# Patient Record
Sex: FEMALE | Race: WHITE | HISPANIC OR LATINO | Employment: UNEMPLOYED | URBAN - METROPOLITAN AREA
[De-identification: names, ages, dates, MRNs, and addresses within clinical notes are randomized per-mention and may not be internally consistent; named-entity substitution may affect disease eponyms.]

---
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
## 2021-12-09 ENCOUNTER — HOSPITAL ENCOUNTER (OUTPATIENT)
Dept: LAB | Facility: MEDICAL CENTER | Age: 21
End: 2021-12-09
Attending: PHYSICIAN ASSISTANT
Payer: MEDICAID

## 2021-12-09 LAB
ALBUMIN SERPL BCP-MCNC: 4.9 G/DL (ref 3.2–4.9)
ALBUMIN/GLOB SERPL: 1.8 G/DL
ALP SERPL-CCNC: 50 U/L (ref 30–99)
ALT SERPL-CCNC: 21 U/L (ref 2–50)
ANION GAP SERPL CALC-SCNC: 14 MMOL/L (ref 7–16)
AST SERPL-CCNC: 24 U/L (ref 12–45)
BASOPHILS # BLD AUTO: 1 % (ref 0–1.8)
BASOPHILS # BLD: 0.07 K/UL (ref 0–0.12)
BILIRUB SERPL-MCNC: 0.8 MG/DL (ref 0.1–1.5)
BUN SERPL-MCNC: 11 MG/DL (ref 8–22)
CALCIUM SERPL-MCNC: 9.3 MG/DL (ref 8.5–10.5)
CHLORIDE SERPL-SCNC: 102 MMOL/L (ref 96–112)
CHOLEST SERPL-MCNC: 187 MG/DL (ref 100–199)
CO2 SERPL-SCNC: 23 MMOL/L (ref 20–33)
CREAT SERPL-MCNC: 0.76 MG/DL (ref 0.5–1.4)
EOSINOPHIL # BLD AUTO: 0.38 K/UL (ref 0–0.51)
EOSINOPHIL NFR BLD: 5.2 % (ref 0–6.9)
ERYTHROCYTE [DISTWIDTH] IN BLOOD BY AUTOMATED COUNT: 40.4 FL (ref 35.9–50)
FOLATE SERPL-MCNC: 24.6 NG/ML
GLOBULIN SER CALC-MCNC: 2.8 G/DL (ref 1.9–3.5)
GLUCOSE SERPL-MCNC: 73 MG/DL (ref 65–99)
HCT VFR BLD AUTO: 44.6 % (ref 37–47)
HDLC SERPL-MCNC: 58 MG/DL
HGB BLD-MCNC: 14.8 G/DL (ref 12–16)
IMM GRANULOCYTES # BLD AUTO: 0.02 K/UL (ref 0–0.11)
IMM GRANULOCYTES NFR BLD AUTO: 0.3 % (ref 0–0.9)
LDLC SERPL CALC-MCNC: 108 MG/DL
LYMPHOCYTES # BLD AUTO: 2.93 K/UL (ref 1–4.8)
LYMPHOCYTES NFR BLD: 39.8 % (ref 22–41)
MCH RBC QN AUTO: 29.4 PG (ref 27–33)
MCHC RBC AUTO-ENTMCNC: 33.2 G/DL (ref 33.6–35)
MCV RBC AUTO: 88.7 FL (ref 81.4–97.8)
MONOCYTES # BLD AUTO: 0.36 K/UL (ref 0–0.85)
MONOCYTES NFR BLD AUTO: 4.9 % (ref 0–13.4)
NEUTROPHILS # BLD AUTO: 3.6 K/UL (ref 2–7.15)
NEUTROPHILS NFR BLD: 48.8 % (ref 44–72)
NRBC # BLD AUTO: 0 K/UL
NRBC BLD-RTO: 0 /100 WBC
PLATELET # BLD AUTO: 329 K/UL (ref 164–446)
PMV BLD AUTO: 11.6 FL (ref 9–12.9)
POTASSIUM SERPL-SCNC: 4 MMOL/L (ref 3.6–5.5)
PROT SERPL-MCNC: 7.7 G/DL (ref 6–8.2)
RBC # BLD AUTO: 5.03 M/UL (ref 4.2–5.4)
SODIUM SERPL-SCNC: 139 MMOL/L (ref 135–145)
T4 FREE SERPL-MCNC: 1.4 NG/DL (ref 0.93–1.7)
TRIGL SERPL-MCNC: 103 MG/DL (ref 0–149)
TSH SERPL DL<=0.005 MIU/L-ACNC: 0.79 UIU/ML (ref 0.38–5.33)
VIT B12 SERPL-MCNC: 573 PG/ML (ref 211–911)
WBC # BLD AUTO: 7.4 K/UL (ref 4.8–10.8)

## 2021-12-09 PROCEDURE — 85025 COMPLETE CBC W/AUTO DIFF WBC: CPT

## 2021-12-09 PROCEDURE — 80061 LIPID PANEL: CPT

## 2021-12-09 PROCEDURE — 84439 ASSAY OF FREE THYROXINE: CPT

## 2021-12-09 PROCEDURE — 36415 COLL VENOUS BLD VENIPUNCTURE: CPT

## 2021-12-09 PROCEDURE — 82306 VITAMIN D 25 HYDROXY: CPT

## 2021-12-09 PROCEDURE — 80053 COMPREHEN METABOLIC PANEL: CPT

## 2021-12-09 PROCEDURE — 82607 VITAMIN B-12: CPT

## 2021-12-09 PROCEDURE — 84443 ASSAY THYROID STIM HORMONE: CPT

## 2021-12-09 PROCEDURE — 82746 ASSAY OF FOLIC ACID SERUM: CPT

## 2021-12-11 LAB — 25(OH)D3 SERPL-MCNC: 29 NG/ML (ref 30–80)

## 2021-12-16 ENCOUNTER — NON-PROVIDER VISIT (OUTPATIENT)
Dept: INTERNAL MEDICINE | Facility: OTHER | Age: 21
End: 2021-12-16
Payer: MEDICAID

## 2021-12-16 VITALS — WEIGHT: 106 LBS | BODY MASS INDEX: 22.25 KG/M2 | HEIGHT: 58 IN

## 2021-12-16 DIAGNOSIS — Z71.3 DIETARY COUNSELING AND SURVEILLANCE: ICD-10-CM

## 2021-12-16 DIAGNOSIS — F50.82 AVOIDANT-RESTRICTIVE FOOD INTAKE DISORDER (ARFID): ICD-10-CM

## 2021-12-16 PROCEDURE — 97802 MEDICAL NUTRITION INDIV IN: CPT | Performed by: DIETITIAN, REGISTERED

## 2021-12-16 ASSESSMENT — FIBROSIS 4 INDEX: FIB4 SCORE: 0.33

## 2021-12-16 NOTE — PATIENT INSTRUCTIONS
Nutrition Commitments:    1. I will air jain brusselsprouts and pair with almonds or nuts I have, sample new food  - knit something after a new food sampled    2. I will sample eggs once more to see if truly sensitive or acceptable    3. I will start back journaling my foods that I am eating with my feelings

## 2021-12-16 NOTE — PROGRESS NOTES
"Ritu Mckeon is a 21 y.o. year old, female returns for nutrition support for disordered eating. Followed weekly with RDN through 2019.    Saw Destinee Friend, therapist w/[facility], last seen possibly a year ago; referred and went to [facility] and had blood drawn but later they closed down and she never had an appointment with them.    ARFID dx, binges on eating safe foods, no purging, no energy, constantly tired  Stopped [facility]- did not have good experience  [employer] passed all classes  Living on her own, lives with boyfriend, does not anticipate to be a long term relationship    Family Support: boyfriend- difficult to understand ED; Ritu is adopted, unaware of medical history; father and grandparents supportive, natural brother out of picture    Wellness Vision: I want to be in a place where I am comfortable, financially, emotionally, physically and spiritually healthy; be happy and be ok with food, being around it, eating it, touching it and overcome phobia of vomiting; 5 years of living with this.    Comments:  Ritu continues to be active in food chaining practice; trying new foods, mushrooms recently and was not anxious; has more challenges with fruit- with ripeness and if ready to eat.     Ritu reports she made taquitos for the first time, not a meat eater, but tired w/chicken; fear of \"getting sick\" after eating new food. Did have some anxiety but not severe.     Recalls first trigger of Avoidance Restrictive eating w/ham & cheese sandwich; lost 50 lbs x 3 months; incident later w/food poisoning.Trauma r/t trying new foods; hard to eat hot foods- thinks it makes her feel full faster; chorizo w/dad- panicked but ate small amounts while hot and tolerated.    Ritu \"hates the feeling of fullness\"; binging occurs b/c does not give her that feeling of satisfaction, later then fullness kicks in and feels sick    No longer connected with a therapist- will reach out for new one   Referral to Lina " "Agustin, therapist at [facility] in ED    Positive Changes Since Last Visit:  Ritu desires to \"venture out and learn how to eat various vegetables\"  Continues to take MVI  B12 level wnl  Vit D lower end of normal, D3 may benefit    Eating Pattern:    Bed by 8-10 pm; wake up at [id number]; stays up unless day off  Within 10-15 min of wake up: PB&J (any bread); eggs not digesting well, no beverage with foods; if drinks cannot eat whole meal  Does her best to drink first then solids; too much may curtail intake  0730:  Snack with boyfriend: chips, bread, granola bar  Homework, knit  12 noon:  chips, granola bar, chocolate  1500: egg or PB&J, or chili lentil or tuna packets or frozen item  Dinner: no real dinner, usually \"snacks\" throughout day  Cheese w/OCA for toleration      Fluids: 20-40 oz. Water per day; rarely apple juice, no alcohol intake-terrified to drink    Physical Activity: none    Allergies:  Lactose intolerance- cheese ok, egg sensitivity    Intervention/Recommendation:  Nutrition commitments made to continue layering understanding of what she can eat; Ritu will re-establish with new therapist on insurance plan.    Weight down 15% since last check in; Ritu reports she is not able to eat much d/t anxiety and fear of food not being \"ripe\" or right    POC: Food chaining to try new foods   Aim for 3 m, 2 s   Maintain weight   Referral to therapist       RTC x 3 weeks        Time Spent:  60 minutes    "

## 2022-01-04 ENCOUNTER — OFFICE VISIT (OUTPATIENT)
Dept: INTERNAL MEDICINE | Facility: OTHER | Age: 22
End: 2022-01-04
Payer: COMMERCIAL

## 2022-01-04 VITALS — WEIGHT: 106 LBS | BODY MASS INDEX: 21.98 KG/M2

## 2022-01-04 DIAGNOSIS — R63.6 UNDERWEIGHT DUE TO INADEQUATE CALORIC INTAKE: ICD-10-CM

## 2022-01-04 DIAGNOSIS — F41.1 GENERALIZED ANXIETY DISORDER: ICD-10-CM

## 2022-01-04 DIAGNOSIS — F50.82 AVOIDANT-RESTRICTIVE FOOD INTAKE DISORDER (ARFID): ICD-10-CM

## 2022-01-04 DIAGNOSIS — R63.4 WEIGHT LOSS: ICD-10-CM

## 2022-01-04 PROCEDURE — 97803 MED NUTRITION INDIV SUBSEQ: CPT | Performed by: DIETITIAN, REGISTERED

## 2022-01-04 ASSESSMENT — FIBROSIS 4 INDEX: FIB4 SCORE: 0.33

## 2022-01-04 NOTE — PATIENT INSTRUCTIONS
Nutrition Commitments:    1. I will set my alarms on my phone to eat- set for 3 times per day: 0500, 1500, 1800- bring awareness to what I am eating and fuel my body  - find some new proteins to bring to work or for home: edamame, lentil pasta, nut butters, garbanzo beans, tofu    2. I will put a post it note on my refrigerator to remind myself of what veggies I have in my fridge    3. Check out [employer] for vegetable storing, buying and eating!

## 2022-01-04 NOTE — PROGRESS NOTES
"Ritu Mckeon is a 21 y.o. year old, female returns for follow up ED. Utilized strategy for anxiety: work on eyebrows, re-direct attention    4/10 of Wellness Vision, wants to reach 7/10    Working 4/7 with school break at [employer]; sleeping not as well since New years    Positive Changes Since Last Visit:  Identifying triggers: nausea, lack of sleep, eating salads +dressing  Likes to have a schedule, easier when school in session  School break-kept herself busy and managed  Strengths in Action: transparency, ability to communicate truth to others she meets, full-disclosure    Review of previous commitments:Friend to try new vegetables with who wants to eat healthier as well  Journaling of foods/feelings: nauseous and anxious were comments  Realized she can be \"lazy\" to cook for herself or do things for herself- ate more junk food with this mind set; junk food: [employer] [place] bars. Jelly fruit, cookies, chocolate, string cheese- leaves her hungry  Re-occurring binge cycle with lack of satisfaction in junk foods eaten  Realizes she cannot sense hunger or satiety- work: string cheese+granola bread, pieces of bread, home w/junk food    Physical Activity:  Mainly work movement; tracks on [employer] 5-10K    email   [email or web address]  Send therapist name/email                                                    Time Spent:      "

## 2022-01-10 PROBLEM — F50.82 AVOIDANT-RESTRICTIVE FOOD INTAKE DISORDER (ARFID): Status: ACTIVE | Noted: 2022-01-10

## 2022-01-11 NOTE — PROGRESS NOTES
"Ritu Mckeon is a 21 y.o. year old, female returns for follow up ED. Utilized strategy for anxiety: work on eyebrows, re-direct attention     4/10 of [employer], wants to reach 7/10     Working 4/7 with school break at [employer]; sleeping not as well since New years     Positive Changes Since Last Visit:  Identifying triggers: nausea, lack of sleep, eating salads +dressing  Likes to have a schedule, easier when school in session  School break-kept herself busy and managed  Strengths in Action: transparency, ability to communicate truth to others she meets, full-disclosure     Review of previous commitments:Friend to try new vegetables with who wants to eat healthier as well  Journaling of foods/feelings: nauseous and anxious were comments  Realized she can be \"lazy\" to cook for herself or do things for herself- ate more junk food with this mind set; junk food: [place] bars. Jelly fruit, cookies, chocolate, string cheese- leaves her hungry  Re-occurring binge cycle with lack of satisfaction in junk foods eaten  Realizes she cannot sense hunger or satiety- work: string cheese+granola bread, pieces of bread, home w/junk food     Physical Activity:  Mainly work movement; tracks on Fit[employer] 5-10K     email   [email or web address]  Send therapist name/email      RTC x 1 month    Time Spent: 60 minutes  "

## 2022-02-02 ENCOUNTER — NON-PROVIDER VISIT (OUTPATIENT)
Dept: INTERNAL MEDICINE | Facility: OTHER | Age: 22
End: 2022-02-02
Payer: COMMERCIAL

## 2022-02-02 VITALS — BODY MASS INDEX: 22.19 KG/M2 | WEIGHT: 107 LBS

## 2022-02-02 DIAGNOSIS — F50.82 AVOIDANT-RESTRICTIVE FOOD INTAKE DISORDER (ARFID): ICD-10-CM

## 2022-02-02 DIAGNOSIS — Z71.3 DIETARY COUNSELING AND SURVEILLANCE: ICD-10-CM

## 2022-02-02 PROCEDURE — 97803 MED NUTRITION INDIV SUBSEQ: CPT | Performed by: DIETITIAN, REGISTERED

## 2022-02-02 ASSESSMENT — FIBROSIS 4 INDEX: FIB4 SCORE: 0.33

## 2022-02-02 NOTE — PROGRESS NOTES
Ritu Mckeon is a 21 y.o. year old, female returns for ARFID follow up.     Positive changes since last visit:  Ritu reports she brok up with her boyfriend on 1/28, but it was a positive step towards her independence; this has been something she planned for a while, mutually beneficial.    Ritu reports she is setting her self up for success, making conscious decisions to focus on herself, gain more confidence in what she can do. Living at the [facility], dad is supportive, many friends and co-workers available and her catNilda is her .    Less anxiety w/eating leftover salads, friend shopped for her, bought larger quantities than she would normally buy: whole bag oranges, celery    Main proteins she can tolerate: breaded chicken, peanuts/almonds, PB, eggs, black beans+white rice    NUTRITION COMMENTS:    Ritu able to make personal meals for herself without having to work around anyone else; focuses on 3 small meal opportunities. Less snacks are being brought in, early dinner extends to later in evening eating when she is hungry. WIN: Breakfast this am: bagel+PB+jelly    Energy level 5/10, would like 7-8/10.     Ritu reports she looked into ED counseling at [facility], does not collaborate with [facility], but may look into virtual meetings that may be free.     New commitments made, reinforced work Ritu is doing and the resilience and strength she is building to keep her health practices in check for nutrition safety/success.    Time Spent:  30 minutes

## 2022-02-03 NOTE — PATIENT INSTRUCTIONS
Nutrition Commitments:    I will keep listening to my body when I am hungry, keep making strides in my conscious decisions and making an eating schedule.

## 2022-02-23 ENCOUNTER — NON-PROVIDER VISIT (OUTPATIENT)
Dept: INTERNAL MEDICINE | Facility: OTHER | Age: 22
End: 2022-02-23
Payer: COMMERCIAL

## 2022-02-23 VITALS — WEIGHT: 107 LBS | BODY MASS INDEX: 22.19 KG/M2

## 2022-02-23 DIAGNOSIS — F50.82 AVOIDANT-RESTRICTIVE FOOD INTAKE DISORDER (ARFID): ICD-10-CM

## 2022-02-23 DIAGNOSIS — F41.1 GENERALIZED ANXIETY DISORDER: ICD-10-CM

## 2022-02-23 PROCEDURE — 97803 MED NUTRITION INDIV SUBSEQ: CPT | Mod: GT | Performed by: DIETITIAN, REGISTERED

## 2022-02-23 ASSESSMENT — FIBROSIS 4 INDEX: FIB4 SCORE: 0.33

## 2022-02-23 NOTE — PROGRESS NOTES
"Ritu Mckeon is a 21 y.o. year old, female returns for MNT follow up for ARFID.    Positive changes since last visit  Adding in hotter foods, stocking up kitchen and nothing \"really bad\" in fridge.    Meal/Eating Pattern:    Not bringing junk foods in: chips, chocolate- hard to control binge eating on  Food chaining continues, adding in whole tomatoes to other foods or chicken on rice- journaling was helpful for her to see what she was doing and seeing snacks vs real meals    Feels better than she has, but not completely at her best    Challenges:  [facility] [employer] is a difficult class. Finding herself binging more, talked to her teacher, found videos to help her and has  tutoring    Eating Schedule- not setting alarms, but listens to hunger signals, assess for after school, 4-6 pm- knows she needs to eat something: eggs, cheese, piece of bread, chili lentils+multi grain tortilla chips or whole grain crax; adds in veggies, no fruit continues for now    Comments:  Focusing on \"healthier foods\" and being mindful, less binging and mindless eating    Feels more control and conscious  Wants to be at a healthy weight- maintaining    [id number]  Ritu shared h/o obesity, high marijuana use and binge eating,   ARFIDx3 years, aversion to hotter foods, does not drink water with food, sensory issues    Lost weight to 140 lbs x 3 mos with ARFID s/s  Granola bars+string cheese+crackers main go-to's  Increasing anxiety with eating specific foods,i.e. ham sandwich which , further weight loss, went for ED support/team/family  Fear of people getting sick or herself getting sick  Small amount of chicken: feels nauseous and extremely full    Binges on comfort foods, chips, granolas  ARFID: hates texture, can't have it, panic attack    Planning to add in more eggs- hot food  Does not like to go out to eat or seeing other people- not knowing if they will get sick  Nausea causes her to feel \"sick\"    Plans to go [place] with new friend she met at work ([place])    Granola bars has always been \"safe food\" for her.    Progress seen. Reinforced independence, confidence and some control over her decisions- living on her own, making strides at her pace.     Discussed seeking therapy again to support her.    RTC x 4-6 weeks.    Time Spent:  60 minutes    "

## 2022-02-23 NOTE — PATIENT INSTRUCTIONS
Nutrition Commitments:    1. I will ask myself questions before a binge/overfull moment  - HALT  - snap myself out of it!  - build confidence in what I can do!  - take mental break  - continue food chaining practices    2. I will see how well I am in small public setting  - get out of discomfort  - bring enjoyment     3. I will look into telemed therapist

## 2022-04-04 ENCOUNTER — NON-PROVIDER VISIT (OUTPATIENT)
Dept: INTERNAL MEDICINE | Facility: OTHER | Age: 22
End: 2022-04-04
Payer: COMMERCIAL

## 2022-04-04 DIAGNOSIS — F50.82 AVOIDANT-RESTRICTIVE FOOD INTAKE DISORDER (ARFID): ICD-10-CM

## 2022-04-04 DIAGNOSIS — R63.4 WEIGHT LOSS: ICD-10-CM

## 2022-04-04 DIAGNOSIS — F41.1 GENERALIZED ANXIETY DISORDER: ICD-10-CM

## 2022-04-04 PROCEDURE — 97803 MED NUTRITION INDIV SUBSEQ: CPT | Performed by: DIETITIAN, REGISTERED

## 2022-04-04 NOTE — PATIENT INSTRUCTIONS
I will plan to go to [employer] event 4/23- preparing for a crowd, getting out of comfort zone.  - sample food from [employer]  - check out a Farmer's Market    I will continue to make more vegetables- choose frozen for convenience, find some fresh that I will cook for myself  - fuel my gut bacteria for good health and best defense system for my body    I will keep up with my White Board- do inventory of what is in my fridge, plan out what I will eat for less wastage and success in eating the food  - every night write on my White Board  - keep food chaining as I fill out my White Board  - visually see what I am doing to make it happen

## 2022-04-04 NOTE — PROGRESS NOTES
Ritu Mckeon is a 21 y.o. year old, female returns for follow up on ED-ARFID.    Positive changes since last visit:  Signed up for two summer clases  [place] with living in her own apt  Two summer session at [employer], aiming for real estate  Found a therapist to start seeing at [facility] and will enroll in one class at [facility] to qualify for [facility] counseling  Met a new friend, Robb, in the last week and is open with her condition with him  Going to [employer] gym and recognizing she may be seeing weight shift- ok with weight increase especially if she is stronger and gaining muscle. PA: once a week commitment - start on Fridays at gym in her apt complex    Working 20-21 hours per week, school f/t, gym time or relaxing with knitting, likes to dance and sing for distressing    Meal/Eating/Environment Pattern:    Food chaining: Ritu reports having a good day with 1/2 chicken, brussels sprouts and rice+cheese- full meal eaten from dinner to bed time, milestone hit- has not experienced this before!    [place] and tuna sandwich were successful for her  WIN: Making healthier choices with limited budget    Work related eating- sampled and kept to little to no meat tacos and eating at dad's house    Dining out: able to try a new taco place- enjoyed and felt confident without anxiety  Experiencing new flavors    Breakfast & Dinner meals consistent; Lunch- not eaten, mainly snacks on granola bars, cheese, Gogurt, peanuts/cashews, and frozen grapes, canned mandarin oranges and peaches- whatever is in the fridge    B: Rika, jana  Hydration: drinking 3-bottles=6 cups per day; Body [place]    Comments:    Ritu reflecting this am: took 6 months to eat a PB&J before work and eat it, accepting more food from others, realized she was unable to do this 6-7 mos ago; able to recognize when wants a tuna sandwich- confidence to make it and eat it without anxiety    Self-regulatinge anxiety  medication w/ going out more- taking 1/2 pill of Hydroxyzine, which calms nerves when feeling anxious    Feels ARFID is improving, slowly gaining confidence in bringing in new foods with positivity    Challenges: meta-phobia, anxiety- will work with therapist    Reinforced great work Ritu has been doing, continue gaining confidence in her independence and healthy decisions.    RTC x 4-6 weeks      Time Spent:  60 minutes

## 2022-04-05 VITALS — BODY MASS INDEX: 22.81 KG/M2 | WEIGHT: 110 LBS

## 2022-04-05 ASSESSMENT — FIBROSIS 4 INDEX: FIB4 SCORE: 0.33

## 2022-05-02 ENCOUNTER — HOSPITAL ENCOUNTER (OUTPATIENT)
Facility: MEDICAL CENTER | Age: 22
End: 2022-05-02
Attending: NURSE PRACTITIONER
Payer: COMMERCIAL

## 2022-05-02 ENCOUNTER — GYNECOLOGY VISIT (OUTPATIENT)
Dept: OBGYN | Facility: CLINIC | Age: 22
End: 2022-05-02
Payer: COMMERCIAL

## 2022-05-02 VITALS
DIASTOLIC BLOOD PRESSURE: 52 MMHG | SYSTOLIC BLOOD PRESSURE: 90 MMHG | WEIGHT: 113.2 LBS | BODY MASS INDEX: 24.42 KG/M2 | HEIGHT: 57 IN

## 2022-05-02 DIAGNOSIS — Z30.09 BIRTH CONTROL COUNSELING: ICD-10-CM

## 2022-05-02 DIAGNOSIS — N89.8 VAGINAL ITCHING: ICD-10-CM

## 2022-05-02 DIAGNOSIS — Z01.419 ENCOUNTER FOR WELL WOMAN EXAM WITH ROUTINE GYNECOLOGICAL EXAM: Primary | ICD-10-CM

## 2022-05-02 DIAGNOSIS — Z11.3 ROUTINE SCREENING FOR STI (SEXUALLY TRANSMITTED INFECTION): ICD-10-CM

## 2022-05-02 DIAGNOSIS — Z01.419 ENCOUNTER FOR WELL WOMAN EXAM WITH ROUTINE GYNECOLOGICAL EXAM: ICD-10-CM

## 2022-05-02 PROCEDURE — 87491 CHLMYD TRACH DNA AMP PROBE: CPT

## 2022-05-02 PROCEDURE — G0101 CA SCREEN;PELVIC/BREAST EXAM: HCPCS | Performed by: NURSE PRACTITIONER

## 2022-05-02 PROCEDURE — 87480 CANDIDA DNA DIR PROBE: CPT

## 2022-05-02 PROCEDURE — 87591 N.GONORRHOEAE DNA AMP PROB: CPT

## 2022-05-02 PROCEDURE — 87660 TRICHOMONAS VAGIN DIR PROBE: CPT

## 2022-05-02 PROCEDURE — 87510 GARDNER VAG DNA DIR PROBE: CPT

## 2022-05-02 RX ORDER — NORETHINDRONE ACETATE AND ETHINYL ESTRADIOL 1MG-20(21)
1 KIT ORAL DAILY
Qty: 28 TABLET | Refills: 11 | Status: SHIPPED | OUTPATIENT
Start: 2022-05-02 | End: 2023-02-15 | Stop reason: SDUPTHER

## 2022-05-02 RX ORDER — BUSPIRONE HYDROCHLORIDE 10 MG/1
10 TABLET ORAL
COMMUNITY

## 2022-05-02 ASSESSMENT — FIBROSIS 4 INDEX: FIB4 SCORE: 0.33

## 2022-05-02 NOTE — PROGRESS NOTES
GYN visit, pap and refill for BC  LMP: 04/13/2022  WT: 113.2 lb  BP: 90/52  Pt states would like to get checked  For yeast and STI testing. States no complaints or concerns zoya [facility] # [phone]

## 2022-05-02 NOTE — PROGRESS NOTES
Ritu Mckeon is a 21 y.o. female who presents for her Gynecologic Exam      HPI Comments: Pt presents for well woman exam. Pt had vaginal ithcing and was told to use monistat. She did and saw improvement in her symptoms but does want to get checked to make sure infection has cleared.  Desires STI testing.  Needs refill on OCPs.Happy with present method.    Patient's last menstrual period was 04/13/2022 (exact date).    Review of Systems   ROS: Patient is feeling well. No dyspnea or chest pain on exertion. No Abdominal pain, change in bowel habits, black or bloody stools. No urinary sx. GYN ROS:normal menses, no abnormal bleeding, pelvic pain , no breast pain or new or enlarging lumps on self exam. Denies breast tenderness, mass, discharge, changes in size or contour, or abnormal cyclic discomfort. No neurological complaints.    Menstrual History: menses regular every 28 days  Contraceptive Method: OCP (estrogen/progesterone)    All PMH, PSH, allergies, social history and FH reviewed and updated today:  Past Medical History:   Diagnosis Date   • Anxiety     Dx's at age 16   • Depression     got resolved per pt     Past Surgical History:   Procedure Laterality Date   • DENTAL EXTRACTION(S)       Patient has no known allergies.  Social History     Socioeconomic History   • Marital status: Single   Tobacco Use   • Smoking status: Former Smoker     Types: Cigarettes   • Smokeless tobacco: Never Used   • Tobacco comment: QUit smoking in 2015   Vaping Use   • Vaping Use: Never used   Substance and Sexual Activity   • Alcohol use: No     Alcohol/week: 0.0 oz   • Drug use: Not Currently     Types: Marijuana, Inhaled     Comment: lass used marijuana in 2016   • Sexual activity: Yes     Partners: Male     Birth control/protection: Pill     Comment: Not    Other Topics Concern   • Inadequate sleep No   • Inadequate exercise Yes   • Poor diet Yes     Family History   Problem Relation Age of Onset   • Diabetes Neg  "Hx    • Heart Disease Neg Hx    • Hypertension Neg Hx      Medications:   Current Outpatient Medications Ordered in Epic   Medication Sig Dispense Refill   • busPIRone (BUSPAR) 10 MG Tab tablet Take 10 mg by mouth.     • hydrOXYzine HCl (ATARAX) 10 MG Tab Take 10 mg by mouth 3 times a day as needed for Itching.     • norethindrone-ethinyl estradiol (JUNEL FE 1/20) 1-20 MG-MCG per tablet Take 1 Tab by mouth every day. 28 Tab 11   • loratadine (CLARITIN) 10 MG Tab Take 1 Tab by mouth every day. 30 Tab 3     No current Epic-ordered facility-administered medications on file.          Objective:   Vital measurements:  BP (!) 90/52 (BP Location: Right arm, Patient Position: Sitting)   Ht 1.448 m (4' 9\")   Wt 51.3 kg (113 lb 3.2 oz)   Body mass index is 24.5 kg/m². (Goal BM I>18 <25)    Physical Exam   Nursing note and vitals reviewed.  Constitutional: She is oriented to person, place, and time. She appears well-developed and well-nourished. No distress.     HEENT:   Head: Normocephalic and atraumatic.   Right Ear: External ear normal.   Left Ear: External ear normal.   Nose: Nose normal.   Eyes: Conjunctivae and EOM are normal. Pupils are equal, round, and reactive to light. No scleral icterus.     Neck: Normal range of motion. Neck supple. No tracheal deviation present. No thyromegaly present.     Pulmonary/Chest: Effort normal and breath sounds normal. No respiratory distress. She has no wheezes. She has no rales. She exhibits no tenderness.     Cardiovascular: Regular, rate and rhythm. No JVD.    Abdominal: Soft. Bowel sounds are normal. She exhibits no distension and no mass. No tenderness. She has no rebound and no guarding.     Breast:  Symmetrical, normal consistency without masses.    Genitourinary:  Pelvic exam was performed with patient supine.  External genitalia, urethral meatus, urethra, bladder and vagina normal. Anus and perineum normal. Bimanual without masses or tenderness.  Vagina is moist with no " lesions, foul discharge, erythema, tenderness or bleeding. No foreign body around the vagina or signs of injury.   Cervix exhibits no motion tenderness, no discharge and no friability.   Uterus is  not deviated, not enlarged, not fixed and not tender.  Right adnexum displays no mass, no tenderness and no fullness. Left adnexum displays no mass, no tenderness and no fullness.     Musculoskeletal: Normal range of motion. She exhibits no edema and no tenderness.     Lymphadenopathy: She has no cervical adenopathy.     Neurological: She is alert and oriented to person, place, and time. She exhibits normal muscle tone.     Skin: Skin is warm and dry. No rash noted. She is not diaphoretic. No erythema. No pallor.     Psychiatric: She has a normal mood and affect. Her behavior is normal. Judgment and thought content normal.           Assessment:     1. Encounter for well woman exam with routine gynecological exam  THINPREP RFLX HPV ASCUS W/CTNG    T.PALLIDUM AB EIA    HIV AG/AB COMBO ASSAY SCREENING    HEP C VIRUS ANTIBODY    VAGINAL PATHOGENS DNA PANEL    CANCELED: VAGINAL PATHOGENS DNA PANEL   2. Routine screening for STI (sexually transmitted infection)  T.PALLIDUM AB EIA    HIV AG/AB COMBO ASSAY SCREENING    HEP C VIRUS ANTIBODY   3. Vaginal itching  VAGINAL PATHOGENS DNA PANEL    CANCELED: VAGINAL PATHOGENS DNA PANEL         Plan:   Pap and physical exam performed  Monthly SBE.  Counseling: breast self exam  STI labs ordered.  Vaginal pathogen done.  Mammograms starting @ age 40 annually.  OCPs refilled.    Chaperone offered and provided by Rica Fuentes MA.

## 2022-05-03 LAB
C TRACH DNA GENITAL QL NAA+PROBE: NEGATIVE
CANDIDA DNA VAG QL PROBE+SIG AMP: NEGATIVE
CYTOLOGY REG CYTOL: NORMAL
G VAGINALIS DNA VAG QL PROBE+SIG AMP: NEGATIVE
N GONORRHOEA DNA GENITAL QL NAA+PROBE: NEGATIVE
SPECIMEN SOURCE: NORMAL
T VAGINALIS DNA VAG QL PROBE+SIG AMP: NEGATIVE

## 2022-05-16 ENCOUNTER — NON-PROVIDER VISIT (OUTPATIENT)
Dept: INTERNAL MEDICINE | Facility: OTHER | Age: 22
End: 2022-05-16
Payer: COMMERCIAL

## 2022-05-16 DIAGNOSIS — F50.82 AVOIDANT-RESTRICTIVE FOOD INTAKE DISORDER (ARFID): ICD-10-CM

## 2022-05-16 PROCEDURE — 97803 MED NUTRITION INDIV SUBSEQ: CPT | Performed by: DIETITIAN, REGISTERED

## 2022-05-16 NOTE — PROGRESS NOTES
"Ritu Mckeon is a 21 y.o. year old, female returns for ED-ARFID follow up.     Positive changes since last visit:    Eating and trying new foods; mentally and physically feeling pain, wreaking \"havoc\" on her stomach- true cramping, feels pushing herself, pushing through s/s    Seeking counselor with \"bad relationship with food\", fear of vomiting and spoiled food via [facility]    Didn't eat much during finals  Dining out: [employer], +steatorrhea- self-diagnosis     Meal/Eating/Environment Pattern:  Feels she is getting too much protein: \"tons of nuts\", eggs, chicken, quinoa chips, pepitas, mostly seeds and nuts    Once a day, oil on top of BM, \"orangey in color\"    Work this summer will be no more than 40 hours, 20-30 hours average;     Comments:    Complete meals have not come in except with pasta salad+vegetables; tried avocado toast, pasta +mozarella+tomato, tomato+cheese+bread    Cheese will not be let go! Lactose sensitivity of some sort, but not connecting symptoms with food    Not able to eat fresh meats that she needs to cook, choose already cooked i.e. frozen meatballs    Fruits/Veg: grapes, keeping to 1 cup veggies- broccoli, salads, plantain chips, canned pears \"wreaked havoc\"; mainly cauliflower, green beans, edamame+veg    Stops when full and satisfied, more energy when eating more vegetables- keep this information in her mind.    Ritu reports she went to a few public events- did not eat there, but enjoyed her time.    White board has been helpful for her; plans out her meals+snacks.  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    RTC x 6 weeks    Time Spent:  60 " minutes

## 2022-05-16 NOTE — PATIENT INSTRUCTIONS
"Nutrition Commitments:    I will dive into counseling to get to the root of my ED and ARFID  I will plan more outdoor picnics, invite my dad  I will eat more vegetables  - look for different colors and kind of vegetables, purple carrots, something new!  - HAVE FUN!  I will dine out \"comfortably\" - plat to go to [employer] and [employer]- look for avocado tomato bruchetta w/oil on side; [place]-  chicken and vegan tacos  - working through uncomfortableness  5. I will go to a Farmer's Market  "

## 2022-05-17 VITALS — BODY MASS INDEX: 25.32 KG/M2 | WEIGHT: 117 LBS

## 2022-05-17 ASSESSMENT — FIBROSIS 4 INDEX: FIB4 SCORE: 0.33

## 2022-05-25 ENCOUNTER — HOSPITAL ENCOUNTER (OUTPATIENT)
Dept: LAB | Facility: MEDICAL CENTER | Age: 22
End: 2022-05-25
Attending: NURSE PRACTITIONER
Payer: COMMERCIAL

## 2022-05-25 DIAGNOSIS — Z01.419 ENCOUNTER FOR WELL WOMAN EXAM WITH ROUTINE GYNECOLOGICAL EXAM: ICD-10-CM

## 2022-05-25 DIAGNOSIS — Z11.3 ROUTINE SCREENING FOR STI (SEXUALLY TRANSMITTED INFECTION): ICD-10-CM

## 2022-05-25 PROCEDURE — 36415 COLL VENOUS BLD VENIPUNCTURE: CPT

## 2022-05-25 PROCEDURE — 87389 HIV-1 AG W/HIV-1&-2 AB AG IA: CPT

## 2022-05-25 PROCEDURE — 86803 HEPATITIS C AB TEST: CPT

## 2022-05-25 PROCEDURE — 86780 TREPONEMA PALLIDUM: CPT

## 2022-05-26 LAB
HCV AB SER QL: NORMAL
HIV 1+2 AB+HIV1 P24 AG SERPL QL IA: NORMAL
T PALLIDUM AB SER QL IA: NORMAL

## 2022-06-27 ENCOUNTER — NON-PROVIDER VISIT (OUTPATIENT)
Dept: INTERNAL MEDICINE | Facility: OTHER | Age: 22
End: 2022-06-27
Payer: COMMERCIAL

## 2022-06-27 VITALS — BODY MASS INDEX: 25.28 KG/M2 | WEIGHT: 116.8 LBS

## 2022-06-27 DIAGNOSIS — F50.82 AVOIDANT-RESTRICTIVE FOOD INTAKE DISORDER (ARFID): ICD-10-CM

## 2022-06-27 DIAGNOSIS — Z71.3 DIETARY COUNSELING AND SURVEILLANCE: ICD-10-CM

## 2022-06-27 DIAGNOSIS — F41.1 GENERALIZED ANXIETY DISORDER: ICD-10-CM

## 2022-06-27 PROCEDURE — 97803 MED NUTRITION INDIV SUBSEQ: CPT | Performed by: DIETITIAN, REGISTERED

## 2022-06-27 ASSESSMENT — FIBROSIS 4 INDEX: FIB4 SCORE: 0.33

## 2022-06-27 NOTE — PATIENT INSTRUCTIONS
I will start making my own homemade salads  - less cost vs. Buying pre-packaged  -utilize recipes given    I will get out to public events more  - outdoor concerts    I will try a new recipe out of my comfort zone  - include 1-2 foods tried & true + 1-added new  - continue food chaining    I will keep moving my body: gym at [facility], walking at work, gym in apt complex and practicing SELF CARE!

## 2022-06-27 NOTE — PROGRESS NOTES
Ritu Mckeon is a 21 y.o. year old, female returns for ARFID follow up.    Positive changes since last visit:  Food Chaining continues  Trying new foods: avocado+toast with other items  Working hard at being mindful of anxiousness: fuels intentionally  Goes outside more often, think through anxious moments- boyfriend notices and Ritu may not know if she is anxious    Seeing therapist weekly at [facility]-     Eating 1.5 meals /day + granola bar in the day: Breakfast and Dinner, which is improved from before    Went to [facility] w//boyfriend and friends, communication skills forming especially with new people she meets    Using gym at home, [employer] and walks at work    Meal/Eating/Environment Pattern:    Tends to be hesitant to try new foods- but addressing her anxiousness; 2-3 day process to gain assurance she will not get sick.    Stands up when eating, never liked sitting with being fidgity  Stomach fullness, uncomfortableness when sitting    New VF: avocado, chyna and loves frozen grapes, canned pineapple, jar of peaches    Comments:    Ritu has not bought as much nuts, stools have returned to normal; buys KIND bars to limit binging on nuts alone.    Accepting feedback from others in her community of support re: dehydration, heat, being fidgity    [place]ent to [email or web address] and other public places and working through anxiousness- continue with therapist.    Increasing self-care and confidence observed. Provided new recipe ideas.    RTC x  6-8 weeks    Time Spent:  60 minutes

## 2022-08-15 ENCOUNTER — NON-PROVIDER VISIT (OUTPATIENT)
Dept: INTERNAL MEDICINE | Facility: OTHER | Age: 22
End: 2022-08-15
Payer: COMMERCIAL

## 2022-08-15 VITALS — WEIGHT: 122.8 LBS | BODY MASS INDEX: 26.57 KG/M2

## 2022-08-15 DIAGNOSIS — F50.82 AVOIDANT-RESTRICTIVE FOOD INTAKE DISORDER (ARFID): ICD-10-CM

## 2022-08-15 DIAGNOSIS — Z71.3 DIETARY COUNSELING AND SURVEILLANCE: ICD-10-CM

## 2022-08-15 DIAGNOSIS — F41.1 GENERALIZED ANXIETY DISORDER: ICD-10-CM

## 2022-08-15 PROCEDURE — 97803 MED NUTRITION INDIV SUBSEQ: CPT | Performed by: DIETITIAN, REGISTERED

## 2022-08-15 ASSESSMENT — FIBROSIS 4 INDEX: FIB4 SCORE: 0.33

## 2022-08-15 NOTE — PATIENT INSTRUCTIONS
I will keep trying new recipes and new combinations from my pantry    I will start getting out more- public places  [place]  - go to [employer]  - go out to eat once- keep working on anxiousness    I will make a meal out of my salads  - create the healthy equation    Measure body composition next follow up.

## 2022-08-15 NOTE — PROGRESS NOTES
"Ritu Mckeon is a 21 y.o. year old, female returns for follow up for ARFID, food chaining     Positive changes since last visit:    Going to the gym 1-2 x/week since summer- [facility] and at home  Participating in social engagement    Summer classes went well, got  a B in Economics    Recent salad: black bean, corn, beet salad- made at home as a meal from my pantry    Therapy continues at [facility] biweekly as avail-  anxiety decreased, able to think through and cope; recent anxiety attack, comforted by boyfriend then able to resolve- Win!    Meal/Eating/Environment Pattern:    Continues with two meals per day; finds her balance  Dad mentioned she is eating more and looks good, not as anxious    Trying new foods-  made a baked grilled chicken sandwich w/mushrooms and choice- able to taste it and feel less anxious    Challenges: financial concerns w/school tuition  Working on her impulsiveness is focus     VF intake: fruit infrequent; vegetables most likely at dinner;Tried apples \"once again\"- eating frequently    Comments:    Anxiety has lessened and more cognizant of others now- less internalizing    Ritu has not ventured out to restaurants- would like to try- ordering her foods her way; learn from experience.    Buspar continues daily- when sitting around people for long period of time  Ritu has a back-up plan    Hydroxyzine prn- when in public places, seeing MD    Much progress, ready for more nutrition information to support her health and weight management.    RTC x 3-4 months    Time Spent:  60 minutes    "

## 2022-10-15 ENCOUNTER — OFFICE VISIT (OUTPATIENT)
Dept: URGENT CARE | Facility: CLINIC | Age: 22
End: 2022-10-15
Payer: COMMERCIAL

## 2022-10-15 ENCOUNTER — HOSPITAL ENCOUNTER (OUTPATIENT)
Facility: MEDICAL CENTER | Age: 22
End: 2022-10-15
Attending: FAMILY MEDICINE
Payer: COMMERCIAL

## 2022-10-15 VITALS
HEART RATE: 98 BPM | SYSTOLIC BLOOD PRESSURE: 102 MMHG | BODY MASS INDEX: 27.22 KG/M2 | DIASTOLIC BLOOD PRESSURE: 76 MMHG | WEIGHT: 125.8 LBS | TEMPERATURE: 97.2 F | RESPIRATION RATE: 16 BRPM | OXYGEN SATURATION: 97 %

## 2022-10-15 DIAGNOSIS — N30.00 ACUTE CYSTITIS WITHOUT HEMATURIA: ICD-10-CM

## 2022-10-15 LAB
APPEARANCE UR: CLEAR
BILIRUB UR STRIP-MCNC: NEGATIVE MG/DL
COLOR UR AUTO: NORMAL
GLUCOSE UR STRIP.AUTO-MCNC: NEGATIVE MG/DL
INT CON NEG: NORMAL
INT CON POS: NORMAL
KETONES UR STRIP.AUTO-MCNC: 15 MG/DL
LEUKOCYTE ESTERASE UR QL STRIP.AUTO: NORMAL
NITRITE UR QL STRIP.AUTO: NEGATIVE
PH UR STRIP.AUTO: 5.5 [PH] (ref 5–8)
POC URINE PREGNANCY TEST: NEGATIVE
PROT UR QL STRIP: NORMAL MG/DL
RBC UR QL AUTO: NORMAL
SP GR UR STRIP.AUTO: 1.03
UROBILINOGEN UR STRIP-MCNC: 0.2 MG/DL

## 2022-10-15 PROCEDURE — 87491 CHLMYD TRACH DNA AMP PROBE: CPT

## 2022-10-15 PROCEDURE — 87591 N.GONORRHOEAE DNA AMP PROB: CPT

## 2022-10-15 PROCEDURE — 87660 TRICHOMONAS VAGIN DIR PROBE: CPT

## 2022-10-15 PROCEDURE — 81002 URINALYSIS NONAUTO W/O SCOPE: CPT | Performed by: FAMILY MEDICINE

## 2022-10-15 PROCEDURE — 81025 URINE PREGNANCY TEST: CPT | Performed by: FAMILY MEDICINE

## 2022-10-15 PROCEDURE — 99213 OFFICE O/P EST LOW 20 MIN: CPT | Performed by: FAMILY MEDICINE

## 2022-10-15 PROCEDURE — 87086 URINE CULTURE/COLONY COUNT: CPT

## 2022-10-15 PROCEDURE — 87480 CANDIDA DNA DIR PROBE: CPT

## 2022-10-15 PROCEDURE — 87510 GARDNER VAG DNA DIR PROBE: CPT

## 2022-10-15 RX ORDER — NITROFURANTOIN 25; 75 MG/1; MG/1
100 CAPSULE ORAL 2 TIMES DAILY
Qty: 10 CAPSULE | Refills: 0 | Status: SHIPPED | OUTPATIENT
Start: 2022-10-15 | End: 2022-10-20

## 2022-10-15 ASSESSMENT — FIBROSIS 4 INDEX: FIB4 SCORE: 0.35

## 2022-10-15 NOTE — PROGRESS NOTES
Subjective:      CC:  presents with Dysuria            Dysuria   This is a new problem. The current episode started in the past 3 days. The problem occurs every urination. The problem has been unchanged. The quality of the pain is described as burning. There has been no fever. Pt is   sexually active.   + vaginal d/c.    Denies genital rash      There is no history of pyelonephritis. Associated symptoms include frequency and urgency. Pertinent negatives include no chills, discharge, flank pain, nausea or vomiting. Pt has tried nothing for the symptoms. There is no history of recurrent UTIs.     Social History     Socioeconomic History    Marital status: Single     Spouse name: Not on file    Number of children: Not on file    Years of education: Not on file    Highest education level: Not on file   Occupational History    Not on file   Tobacco Use    Smoking status: Former     Types: Cigarettes    Smokeless tobacco: Never    Tobacco comments:     QUit smoking in 2015   Vaping Use    Vaping Use: Never used   Substance and Sexual Activity    Alcohol use: No     Alcohol/week: 0.0 oz    Drug use: Not Currently     Types: Marijuana, Inhaled     Comment: lass used marijuana in 2016    Sexual activity: Yes     Partners: Male     Birth control/protection: Pill     Comment: Not    Other Topics Concern    Behavioral problems Not Asked    Interpersonal relationships Not Asked    Sad or not enjoying activities Not Asked    Suicidal thoughts Not Asked    Poor school performance Not Asked    Reading difficulties Not Asked    Speech difficulties Not Asked    Writing difficulties Not Asked    Inadequate sleep No    Excessive TV viewing Not Asked    Excessive video game use Not Asked    Inadequate exercise Yes    Sports related Not Asked    Poor diet Yes    Family concerns for drug/alcohol abuse Not Asked    Poor oral hygiene Not Asked    Bike safety Not Asked    Family concerns vehicle safety Not Asked   Social History  Narrative    Not on file     Social Determinants of Health     Financial Resource Strain: Not on file   Food Insecurity: Not on file   Transportation Needs: Not on file   Physical Activity: Not on file   Stress: Not on file   Social Connections: Not on file   Intimate Partner Violence: Not on file   Housing Stability: Not on file         Family History   Problem Relation Age of Onset    Diabetes Neg Hx     Heart Disease Neg Hx     Hypertension Neg Hx          No Known Allergies        Current Outpatient Medications on File Prior to Visit   Medication Sig Dispense Refill    busPIRone (BUSPAR) 10 MG Tab tablet Take 10 mg by mouth.      norethindrone-ethinyl estradiol (JUNEL FE 1/20) 1-20 MG-MCG per tablet Take 1 Tablet by mouth every day. 28 Tablet 11    hydrOXYzine HCl (ATARAX) 10 MG Tab Take 10 mg by mouth 3 times a day as needed for Itching.      loratadine (CLARITIN) 10 MG Tab Take 1 Tab by mouth every day. 30 Tab 3     No current facility-administered medications on file prior to visit.       Review of Systems   Constitutional: Negative for chills.   Respiratory: Negative for shortness of breath.    Cardiovascular: Negative for chest pain.   Gastrointestinal: Negative for nausea, vomiting and abdominal pain.   Genitourinary: Positive for dysuria, urgency and frequency. Negative for flank pain.   Skin: Negative for rash.   Neurological: Negative for dizziness and headaches.   All other systems reviewed and are negative.         Objective:      /76   Pulse 98   Temp 36.2 °C (97.2 °F) (Temporal)   Resp 16   Wt 57.1 kg (125 lb 12.8 oz)   SpO2 97%       Physical Exam   Constitutional: pt is oriented to person, place, and time. Pt appears well-developed and well-nourished. No distress.   HENT:   Head: Normocephalic and atraumatic.   Mouth/Throat: Mucous membranes are normal.   Eyes: Conjunctivae and EOM are normal. Pupils are equal, round, and reactive to light. Right eye exhibits no discharge. Left eye  exhibits no discharge. No scleral icterus.   Neck: Normal range of motion. Neck supple.   Cardiovascular: Normal rate, regular rhythm, normal heart sounds and intact distal pulses.    No murmur heard.  Pulmonary/Chest: Effort normal and breath sounds normal. No respiratory distress. Pt has no wheezes,  rales.   Abdominal: Bowel sounds are normal. Pt exhibits no distension and no mass. There is no tenderness. There is no rebound, no guarding and no CVA tenderness.   Neurological: pt is alert and oriented to person, place, and time.   Skin: Skin is warm and dry.   Psychiatric: behavior is normal.   Nursing note and vitals reviewed.           Lab Results   Component Value Date/Time    POCCOLOR yellow 05/21/2019 11:35 AM    POCAPPEAR clear 05/21/2019 11:35 AM    POCLEUKEST neg 05/21/2019 11:35 AM    POCNITRITE neg 05/21/2019 11:35 AM    POCUROBILIGE 0.2 05/21/2019 11:35 AM    POCPROTEIN neg 05/21/2019 11:35 AM    POCURPH 7.0 05/21/2019 11:35 AM    POCBLOOD neg 05/21/2019 11:35 AM    POCSPGRV 1.025 05/21/2019 11:35 AM    POCKETONES 15 05/21/2019 11:35 AM    POCBILIRUBIN neg 05/21/2019 11:35 AM    POCGLUCUA ng 05/21/2019 11:35 AM            Assessment/Plan:     1. Acute cystitis without hematuria     UA fidings c/w infection    Patient was advised that antibiotics may interfere with the effectiveness of oral contraceptives and was advised to abstain from sexual activity while taking the antibiotic.     - nitrofurantoin (MACROBID) 100 MG Cap; Take 1 Capsule by mouth 2 times a day for 5 days.  Dispense: 10 Capsule; Refill: 0  - VAGINAL PATHOGENS DNA PANEL; Future  - Chlamydia/GC, PCR (Urine); Future      Differential diagnosis, natural history, supportive care, and indications for immediate follow-up discussed. All questions answered. Patient agrees with the plan of care.     Follow-up as needed if symptoms worsen or fail to improve to PCP, Urgent care or Emergency Room.     I have personally reviewed prior external notes  and test results pertinent to today's visit.  I have independently reviewed and interpreted all diagnostics ordered during this urgent care acute visit.

## 2022-10-16 DIAGNOSIS — N30.00 ACUTE CYSTITIS WITHOUT HEMATURIA: ICD-10-CM

## 2022-10-16 LAB
CANDIDA DNA VAG QL PROBE+SIG AMP: POSITIVE
G VAGINALIS DNA VAG QL PROBE+SIG AMP: POSITIVE
T VAGINALIS DNA VAG QL PROBE+SIG AMP: NEGATIVE

## 2022-10-17 ENCOUNTER — NON-PROVIDER VISIT (OUTPATIENT)
Dept: INTERNAL MEDICINE | Facility: OTHER | Age: 22
End: 2022-10-17
Payer: COMMERCIAL

## 2022-10-17 VITALS — BODY MASS INDEX: 27.05 KG/M2 | WEIGHT: 125 LBS

## 2022-10-17 DIAGNOSIS — F41.1 GENERALIZED ANXIETY DISORDER: ICD-10-CM

## 2022-10-17 DIAGNOSIS — F50.82 AVOIDANT-RESTRICTIVE FOOD INTAKE DISORDER (ARFID): ICD-10-CM

## 2022-10-17 LAB
C TRACH DNA GENITAL QL NAA+PROBE: NEGATIVE
N GONORRHOEA DNA GENITAL QL NAA+PROBE: NEGATIVE
SPECIMEN SOURCE: NORMAL

## 2022-10-17 PROCEDURE — 97803 MED NUTRITION INDIV SUBSEQ: CPT | Performed by: DIETITIAN, REGISTERED

## 2022-10-17 ASSESSMENT — FIBROSIS 4 INDEX: FIB4 SCORE: 0.35

## 2022-10-17 NOTE — PROGRESS NOTES
Ritu Mckeon is a 22 y.o. year old, female returns for MNT and ARFID follow up. Currently Rx for UTI    No weight check due to ED and anxiety concerns    Positive changes since last visit:  Food chaining continues:  Ritu continues to sample some new vegetables- really likes bell peppers+ cheese; accomplished commitments from August visit- realistic goals made; made a quinoa/kale/chicken salad and other combinations w/healthy equation    Recognizes she is not deliberately restricting, eating more often    Going to the gym 1-2x a week consistently- squats 60#, lunges, dead lifts 60#, bench pressing 30#; [employer] every Thursday- hip hop class  Lifting weights- more muscle in thighs  Walks to class twice a week, walks at work    Meal/Eating/Environment Pattern:    Best self- leaner, physically active    Challenge: may see some binge eating on junk food- string cheese, then chocolate after; granola bar then eats rest of sandwich left then overly full and doesn't like the feeling she puts herself in- uncomfortable and anxiety with guilt; more carbs- breads     Did not see therapist for the month of September    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 for behavior therapy and support- free services as NSHE student    Portioned out and planning provides less anxiety for her  Barrier: sporadic practice    Ritu states she took up [employer]- Terence season coming up and gifts are being made; gets her off her phone, listens to podcasts that interests her.    Agriculture and economics dual degree    Challenge: boyfriend eats out and Ritu feels she has gained weight d/t eating out/fast food    Ritu desires  to create a new normal: cook at home 2-3 x/week (down from 3-4 x/week with fast food); mainly makes salads and sandwiches to keep it simple    Feels since last year: more comfortable with food, emotionally in a better place,       RTC x 2 months    Time Spent:  60 minutes

## 2022-10-17 NOTE — PATIENT INSTRUCTIONS
New Normal:    Remember my Strengths:  Being Physically Active, Maintain a decent relationship with food, Maintain my School-Work-Life balance    2.   Keep up with Dance Class    3.  I will continue adding in new vegetables, especially those I have grown in my horticulture class.    4. I will remind myself of the good support system I have  - find my gratitude and podcasts to reinforce a healthy grateful mind

## 2022-10-18 LAB
BACTERIA UR CULT: NORMAL
SIGNIFICANT IND 70042: NORMAL
SITE SITE: NORMAL
SOURCE SOURCE: NORMAL

## 2023-02-15 ENCOUNTER — HOSPITAL ENCOUNTER (OUTPATIENT)
Facility: MEDICAL CENTER | Age: 23
End: 2023-02-15
Attending: NURSE PRACTITIONER
Payer: COMMERCIAL

## 2023-02-15 ENCOUNTER — GYNECOLOGY VISIT (OUTPATIENT)
Dept: OBGYN | Facility: CLINIC | Age: 23
End: 2023-02-15
Payer: COMMERCIAL

## 2023-02-15 VITALS
BODY MASS INDEX: 26.54 KG/M2 | HEIGHT: 57 IN | SYSTOLIC BLOOD PRESSURE: 110 MMHG | DIASTOLIC BLOOD PRESSURE: 66 MMHG | WEIGHT: 123 LBS

## 2023-02-15 DIAGNOSIS — Z30.09 BIRTH CONTROL COUNSELING: ICD-10-CM

## 2023-02-15 DIAGNOSIS — N89.8 VAGINAL DISCHARGE: Primary | ICD-10-CM

## 2023-02-15 DIAGNOSIS — N89.8 VAGINAL DISCHARGE: ICD-10-CM

## 2023-02-15 PROCEDURE — G0101 CA SCREEN;PELVIC/BREAST EXAM: HCPCS | Performed by: NURSE PRACTITIONER

## 2023-02-15 PROCEDURE — 87591 N.GONORRHOEAE DNA AMP PROB: CPT

## 2023-02-15 PROCEDURE — 87660 TRICHOMONAS VAGIN DIR PROBE: CPT

## 2023-02-15 PROCEDURE — 87510 GARDNER VAG DNA DIR PROBE: CPT

## 2023-02-15 PROCEDURE — 87480 CANDIDA DNA DIR PROBE: CPT

## 2023-02-15 PROCEDURE — 87491 CHLMYD TRACH DNA AMP PROBE: CPT

## 2023-02-15 RX ORDER — ONDANSETRON 4 MG/1
4 TABLET, ORALLY DISINTEGRATING ORAL EVERY 6 HOURS PRN
COMMUNITY

## 2023-02-15 RX ORDER — NORETHINDRONE ACETATE AND ETHINYL ESTRADIOL 1MG-20(21)
1 KIT ORAL DAILY
Qty: 28 TABLET | Refills: 11 | Status: SHIPPED | OUTPATIENT
Start: 2023-02-15 | End: 2024-01-15 | Stop reason: SDUPTHER

## 2023-02-15 ASSESSMENT — FIBROSIS 4 INDEX: FIB4 SCORE: 0.35

## 2023-02-15 NOTE — PROGRESS NOTES
Patient here for GYN exam.  C/o vaginal discharge, white, thick for 2 weeks, some itching  LMP=  1/17/23  BCM:pills  Last pap: 5/2/22=negative  Phone number:[phone]  Pharmacy verified

## 2023-02-15 NOTE — PROGRESS NOTES
HPI Comments: Ritu Mckeon is a 22 y.o. y.o. female who presents for her Gynecologic Exam.      Pt reports white clumpy discharge with itching.   LMP 1/17/2023  Pt is sexually active with one male partner  She is currently using Junel Fe OCPs for BCM  .  ROS: Patient is feeling well. No dyspnea or chest pain on exertion. No Abdominal pain, change in bowel habits, black or bloody stools. No urinary sx. GYN ROS:normal menses, no abnormal bleeding, or pelvic pain, no breast pain or new or enlarging lumps on self exam. Denies breast tenderness, mass, discharge, changes in size or contour, or abnormal cyclic discomfort.   No neurological complaints.  Pertinent positives documented in HPI and all other systems reviewed & are negative    All PMH, PSH, allergies, social history and FH reviewed and updated today:  Past Medical History:   Diagnosis Date    Anxiety     Dx's at age 16    Depression     got resolved per pt     Past Surgical History:   Procedure Laterality Date    DENTAL EXTRACTION(S)       Patient has no known allergies.  Social History     Socioeconomic History    Marital status: Single   Tobacco Use    Smoking status: Former     Types: Cigarettes    Smokeless tobacco: Never    Tobacco comments:     QUit smoking in 2015   Vaping Use    Vaping Use: Never used   Substance and Sexual Activity    Alcohol use: No     Alcohol/week: 0.0 oz    Drug use: Not Currently     Types: Marijuana, Inhaled     Comment: yasmin used marijuana in 2016    Sexual activity: Yes     Partners: Male     Birth control/protection: Pill     Comment: Not    Other Topics Concern    Inadequate sleep No    Inadequate exercise Yes    Poor diet Yes     Family History   Problem Relation Age of Onset    Diabetes Neg Hx     Heart Disease Neg Hx     Hypertension Neg Hx      Medications:   Current Outpatient Medications Ordered in Epic   Medication Sig Dispense Refill    ondansetron (ZOFRAN ODT) 4 MG TABLET DISPERSIBLE Take 4 mg by mouth  "every 6 hours as needed for Nausea/Vomiting.      busPIRone (BUSPAR) 10 MG Tab tablet Take 10 mg by mouth.      hydrOXYzine HCl (ATARAX) 10 MG Tab Take 10 mg by mouth 3 times a day as needed for Itching.      loratadine (CLARITIN) 10 MG Tab Take 1 Tab by mouth every day. 30 Tab 3    norethindrone-ethinyl estradiol (JUNEL FE 1/20) 1-20 MG-MCG per tablet Take 1 Tablet by mouth every day. 28 Tablet 11     No current [facility]-ordered facility-administered medications on file.          Objective:   Vital measurements:  /66 (BP Location: Left arm, Patient Position: Sitting, BP Cuff Size: Adult)   Ht 4' 9\"   Wt 123 lb   Body mass index is 26.62 kg/m². (Goal BM I>18 <25)    Physical Exam   Nursing note and vitals reviewed.  Constitutional: She is oriented to person, place, and time. She appears well-developed and well-nourished. No distress.     HEENT:   Head: Normocephalic and atraumatic.   Right Ear: External ear normal.   Left Ear: External ear normal.   Nose: Nose normal.   Eyes: Conjunctivae and EOM are normal. Pupils are equal, round, and reactive to light. No scleral icterus.     Neck: Normal range of motion. Neck supple. No tracheal deviation present. No thyromegaly present.     Pulmonary/Chest: Effort normal and breath sounds normal. No respiratory distress. She has no wheezes. She has no rales. She exhibits no tenderness.     Cardiovascular: Regular, rate and rhythm. No JVD.    Abdominal: Soft. Bowel sounds are normal. She exhibits no distension and no mass. No tenderness. She has no rebound and no guarding.     Breast:  declined    Genitourinary:  Pelvic exam was performed with patient supine.  External genitalia with no abnormal pigmentation, labial fusion,rash, tenderness, lesion or injury to the labia bilaterally.  Vagina is moist with no lesions, foul discharge, erythema, tenderness or bleeding. No foreign body around the vagina or signs of injury.   Cervix exhibits no motion tenderness, no discharge and " no friability.   Uterus is midline not deviated, not enlarged, not fixed and not tender.  Right adnexum displays no mass, no tenderness and no fullness. Left adnexum displays no mass, no tenderness and no fullness.     Musculoskeletal: Normal range of motion. She exhibits no edema and no tenderness.     Lymphadenopathy: She has no cervical adenopathy.     Neurological: She is alert and oriented to person, place, and time. She exhibits normal muscle tone.     Skin: Skin is warm and dry. No rash noted. She is not diaphoretic. No erythema. No pallor.     Psychiatric: She has a normal mood and affect. Her behavior is normal. Judgment and thought content normal.               Assessment:     1. Vaginal discharge  VAGINAL PATHOGENS DNA PANEL    Chlamydia/GC, PCR (Genital/Anal swab)      2. Birth control counseling  norethindrone-ethinyl estradiol (JUNEL FE 1/20) 1-20 MG-MCG per tablet          Assessment:  well woman                         Possible vaginitis      Plan:   Physical exam performed  Rx for Junel Fe refill  Labs: GCCT, vaginal pathogen  Counseling: [facility]istat 7 nightly for 7 nights  Pt has Aniyah, agreeable to receiving information by email.

## 2023-02-16 LAB
C TRACH DNA GENITAL QL NAA+PROBE: NEGATIVE
CANDIDA DNA VAG QL PROBE+SIG AMP: NEGATIVE
G VAGINALIS DNA VAG QL PROBE+SIG AMP: NEGATIVE
N GONORRHOEA DNA GENITAL QL NAA+PROBE: NEGATIVE
SPECIMEN SOURCE: NORMAL
T VAGINALIS DNA VAG QL PROBE+SIG AMP: NEGATIVE

## 2024-01-15 ENCOUNTER — GYNECOLOGY VISIT (OUTPATIENT)
Dept: OBGYN | Facility: CLINIC | Age: 24
End: 2024-01-15
Payer: COMMERCIAL

## 2024-01-15 VITALS
SYSTOLIC BLOOD PRESSURE: 90 MMHG | HEIGHT: 57 IN | DIASTOLIC BLOOD PRESSURE: 64 MMHG | WEIGHT: 109 LBS | BODY MASS INDEX: 23.51 KG/M2

## 2024-01-15 DIAGNOSIS — Z30.09 BIRTH CONTROL COUNSELING: ICD-10-CM

## 2024-01-15 DIAGNOSIS — Z00.00 WELL WOMAN EXAM WITHOUT GYNECOLOGICAL EXAM: ICD-10-CM

## 2024-01-15 PROCEDURE — 99395 PREV VISIT EST AGE 18-39: CPT | Performed by: NURSE PRACTITIONER

## 2024-01-15 PROCEDURE — 3074F SYST BP LT 130 MM HG: CPT | Performed by: NURSE PRACTITIONER

## 2024-01-15 PROCEDURE — 3078F DIAST BP <80 MM HG: CPT | Performed by: NURSE PRACTITIONER

## 2024-01-15 RX ORDER — NORETHINDRONE ACETATE AND ETHINYL ESTRADIOL 1MG-20(21)
1 KIT ORAL DAILY
Qty: 28 TABLET | Refills: 12 | Status: SHIPPED | OUTPATIENT
Start: 2024-01-15

## 2024-01-15 NOTE — PROGRESS NOTES
Ritu Mckeon is a 23 y.o. y.o. female who presents for her annual gynecological exam.       HPI Comments: Pt reports no issues at this time. She is here for  birth control refill.     Review of Systems:  Cardio: Denies any issues.   Respiratory: Denies any issues.   Constitutional:  Denies any issues.   : Luke any issues.   Abdominal: Denies any issues.   Psychosocial: Denies any issues.   EENT: Denies any issues.   Metabolic: Denies any issues.   Pertinent positives documented in HPI and all other systems reviewed & are negative.     Gynecological hx:   Last pap was 2022 and WNL. No hx of abnormal cervical cytology.     Denies any hx of STIs and was last tested for STIs 2023/2022.     Patient's last menstrual period was 12/13/2023 (approximate).. Reports has regular periods every 28 days lasting 7 days and started menstruating at age 14.     Currently sexually active with 1 sexual partner who identifies as a man. She has had a total of 5 sexual partners in lifetime. She is satisfied with her sexual experiences. She does have some soreness and swelling after having sex that resolves quickly and she believes is related to partners size, she does use water-based lube and it is not painful during sex. This was not an issue with her previous partners.      Reports does use birth control: Junel. Does not desire a pregnancy at this time and would like to continue using Junel. She uses nothing for safe sex methods as she is in monogamous relationship.      Denies any history of breast issues, surgeries, cancer.     OB Hx:  - n/a    Reports she is in treatment for anxiety and PTSD, and depression previously. Declines any other need for referrals for mental health.   Denies any hx of or current issues with interpersonal violence.   Denies any use of tobacco, alcohol, drugs.     All PMH, PSH, allergies, social history and FH reviewed and updated today:  Past Medical History:   Diagnosis Date    Depression     got  "resolved per pt     Past Surgical History:   Procedure Laterality Date    DENTAL EXTRACTION(S)      2022     Patient has no known allergies.  Social History     Socioeconomic History    Marital status: Single   Tobacco Use    Smoking status: Never    Smokeless tobacco: Never    Tobacco comments:     QUit smoking in 2015   Vaping Use    Vaping Use: Never used   Substance and Sexual Activity    Alcohol use: No     Alcohol/week: 0.0 oz    Drug use: Not Currently     Types: Marijuana, Inhaled     Comment: lass used marijuana in 2016    Sexual activity: Yes     Partners: Male     Birth control/protection: Pill   Other Topics Concern    Inadequate sleep No    Inadequate exercise Yes    Poor diet Yes     Family History   Problem Relation Age of Onset    Diabetes Neg Hx     Heart Disease Neg Hx     Hypertension Neg Hx      Medications:   Current Outpatient Medications Ordered in Epic   Medication Sig Dispense Refill    ondansetron (ZOFRAN ODT) 4 MG TABLET DISPERSIBLE Take 4 mg by mouth every 6 hours as needed for Nausea/Vomiting.      norethindrone-ethinyl estradiol (JUNEL FE 1/20) 1-20 MG-MCG per tablet Take 1 Tablet by mouth every day. 28 Tablet 11    busPIRone (BUSPAR) 10 MG Tab tablet Take 10 mg by mouth.      hydrOXYzine HCl (ATARAX) 10 MG Tab Take 10 mg by mouth 3 times a day as needed for Itching.      loratadine (CLARITIN) 10 MG Tab Take 1 Tab by mouth every day. 30 Tab 3     No current Epic-ordered facility-administered medications on file.          Objective:   Vital measurements:  BP 90/64 (BP Location: Left arm, Patient Position: Sitting, BP Cuff Size: Adult)   Ht 4' 9\"   Wt 109 lb   Body mass index is 23.59 kg/m². (Goal BM I>18 <25)    Physical Exam   Nursing note and vitals reviewed.    Constitutional: She is oriented to person, place, and time. She appears well-developed and well-nourished. No distress.     HEENT:   Head: Normocephalic and atraumatic.   Right Ear: External ear normal.   Left Ear: External " ear normal.   Nose: Nose normal.   Eyes: Conjunctivae and EOM are normal. Pupils are equal, round, and reactive to light. No scleral icterus.     Neck: Normal range of motion. Neck supple. No tracheal deviation present. No thyromegaly present.     Pulmonary/Chest: Effort normal and breath sounds normal. No respiratory distress. She has no wheezes. She has no rales. She exhibits no tenderness.     Cardiovascular: Regular, rate and rhythm. No JVD.    Abdominal: Soft. Bowel sounds are normal. She exhibits no distension and no mass. No tenderness. She has no rebound and no guarding.     Breast:  Education on self breast exams provided.     Genitourinary:  Pelvic exam was deferred     Musculoskeletal: Normal range of motion. She exhibits no edema and no tenderness.     Lymphadenopathy: She has no cervical adenopathy.     Neurological: She is alert and oriented to person, place, and time. She exhibits normal muscle tone.     Skin: Skin is warm and dry. No rash noted. She is not diaphoretic. No erythema. No pallor.     Psychiatric: She has a normal mood and affect. Her behavior is normal. Judgment and thought content normal.      Assessment:     Well woman [facility] gynecological exam       Plan:   Pap UTD from 2022; physical exam performed  Refill on Junel  Recommended coconut oil or vitamin E or sitz baths with epsom salts as aftercare for sex and to call if worsening or other issues   STI screening via blood declined today  Monthly self breast exam education provided  HPV vaccine candidate: pt already got it  Pt reports she does not have PCP--> information provided on where to establish/ pt desires to do baseline labs with us today   Encourage exercise and proper diet  Return to clinic: one year

## 2024-01-15 NOTE — PROGRESS NOTES
Patient here for annual exam. / BC Check Up   Last pap done/results : 5/2022 - WNL   LMP : 12/13/23 [facility]   BCM : Pills - Renew   Pt states she has no concerns.

## 2025-01-30 DIAGNOSIS — Z30.09 BIRTH CONTROL COUNSELING: ICD-10-CM

## 2025-01-30 RX ORDER — NORETHINDRONE ACETATE AND ETHINYL ESTRADIOL 1MG-20(21)
1 KIT ORAL DAILY
Qty: 28 TABLET | Refills: 12 | Status: SHIPPED | OUTPATIENT
Start: 2025-01-30

## 2025-02-19 ENCOUNTER — GYNECOLOGY VISIT (OUTPATIENT)
Dept: OBGYN | Facility: CLINIC | Age: 25
End: 2025-02-19
Payer: COMMERCIAL

## 2025-02-19 ENCOUNTER — HOSPITAL ENCOUNTER (OUTPATIENT)
Facility: MEDICAL CENTER | Age: 25
End: 2025-02-19
Payer: COMMERCIAL

## 2025-02-19 VITALS
HEIGHT: 57 IN | BODY MASS INDEX: 25.16 KG/M2 | WEIGHT: 116.6 LBS | SYSTOLIC BLOOD PRESSURE: 90 MMHG | DIASTOLIC BLOOD PRESSURE: 64 MMHG

## 2025-02-19 DIAGNOSIS — Z01.419 ENCOUNTER FOR GYNECOLOGICAL EXAMINATION WITHOUT ABNORMAL FINDING: ICD-10-CM

## 2025-02-19 DIAGNOSIS — Z30.09 BIRTH CONTROL COUNSELING: ICD-10-CM

## 2025-02-19 DIAGNOSIS — B37.31 YEAST INFECTION INVOLVING THE VAGINA AND SURROUNDING AREA: ICD-10-CM

## 2025-02-19 DIAGNOSIS — Z01.419 WELL WOMAN EXAM: ICD-10-CM

## 2025-02-19 PROCEDURE — 87491 CHLMYD TRACH DNA AMP PROBE: CPT

## 2025-02-19 PROCEDURE — 87591 N.GONORRHOEAE DNA AMP PROB: CPT

## 2025-02-19 PROCEDURE — 87660 TRICHOMONAS VAGIN DIR PROBE: CPT

## 2025-02-19 PROCEDURE — 87510 GARDNER VAG DNA DIR PROBE: CPT

## 2025-02-19 PROCEDURE — 88142 CYTOPATH C/V THIN LAYER: CPT

## 2025-02-19 PROCEDURE — 87480 CANDIDA DNA DIR PROBE: CPT

## 2025-02-19 RX ORDER — NORETHINDRONE ACETATE AND ETHINYL ESTRADIOL 1MG-20(21)
1 KIT ORAL DAILY
Qty: 28 TABLET | Refills: 12 | Status: SHIPPED | OUTPATIENT
Start: 2025-02-19

## 2025-02-19 RX ORDER — MICONAZOLE NITRATE 2 %
1 CREAM WITH APPLICATOR VAGINAL NIGHTLY
Qty: 7 EACH | Refills: 0 | Status: SHIPPED | OUTPATIENT
Start: 2025-02-19 | End: 2025-02-26

## 2025-02-19 NOTE — PROGRESS NOTES
Patient here for annual exam  Pt states : when she wipes it hurts, bleeding on the outside. Pt states vaginal dryness.    Last pap done/results : 05/03/2022, will do one today   LMP : 02/12/2025  BCM : pills  Last Mammogram, if applicable: n/a due to age   Pt states   Phone :[phone]  Pharmacy verified

## 2025-02-19 NOTE — PROGRESS NOTES
Ritu Mckeon is a 24 y.o. y.o. female who presents for her Gynecologic Exam        HPI Comments: Pt presents for well woman exam. Pt has complaints of vaginal irritation and dryness for the past month. She sometimes experiences spotting when she wipes. She has been using Vaseline on her vagina to help alleviate her symptoms. She denies using any new soaps, lotions or detergents. States periods are regular and monthly. She reports she is sexually active with one male partner but her vulva is so painful that she has been avoiding intercourse for the past month. She is using Junel as her BCM and she is happy with this method, desires a refill today. She does not desire pregnancy within the next year. Patient's last menstrual period was 02/12/2025 (exact date).  .  Review of Systems   Pertinent positives documented in HPI and all other systems reviewed & are negative    All PMH, PSH, allergies, social history and FH reviewed and updated today:  Past Medical History:   Diagnosis Date    Depression     got resolved per pt     Past Surgical History:   Procedure Laterality Date    DENTAL EXTRACTION(S)      2022     Pollen extract  Social History     Socioeconomic History    Marital status: Single   Tobacco Use    Smoking status: Never    Smokeless tobacco: Never    Tobacco comments:     QUit smoking in 2015   Vaping Use    Vaping status: Never Used   Substance and Sexual Activity    Alcohol use: No     Alcohol/week: 0.0 oz    Drug use: Not Currently     Types: Marijuana, Inhaled     Comment: yasmin used marijuana in 2016    Sexual activity: Yes     Partners: Male     Birth control/protection: Pill   Other Topics Concern    Inadequate sleep No    Inadequate exercise Yes    Poor diet Yes     Family History   Problem Relation Age of Onset    Diabetes Neg Hx     Heart Disease Neg Hx     Hypertension Neg Hx      Medications:   Current Outpatient Medications Ordered in Epic   Medication Sig Dispense Refill     "norethindrone-ethinyl estradiol (JUNEL FE 1/20) 1-20 MG-MCG per tablet Take 1 Tablet by mouth every day. 28 Tablet 12    miconazole (MICONAZOLE 7) 2 % Cream Insert 1 Applicator into the vagina every evening for 7 days. 7 Each 0    ondansetron (ZOFRAN ODT) 4 MG TABLET DISPERSIBLE Take 4 mg by mouth every 6 hours as needed for Nausea/Vomiting.      busPIRone (BUSPAR) 10 MG Tab tablet Take 10 mg by mouth.      hydrOXYzine HCl (ATARAX) 10 MG Tab Take 10 mg by mouth 3 times a day as needed for Itching.      loratadine (CLARITIN) 10 MG Tab Take 1 Tab by mouth every day. 30 Tab 3     No current Epic-ordered facility-administered medications on file.          Objective:   Vital measurements:  BP 90/64 (BP Location: Right arm, Patient Position: Sitting, BP Cuff Size: Adult)   Ht 4' 9\"   Wt 116 lb 9.6 oz   Body mass index is 25.23 kg/m². (Goal BM I>18 <25)    Physical Exam   Nursing note and vitals reviewed.  Constitutional: She is oriented to person, place, and time. She appears well-developed and well-nourished. No distress.     HEENT:   Head: Normocephalic and atraumatic.   Right Ear: External ear normal.   Left Ear: External ear normal.   Nose: Nose normal.   Eyes: Conjunctivae and EOM are normal. Pupils are equal, round, and reactive to light. No scleral icterus.     Neck: Normal range of motion. Neck supple. No tracheal deviation present. No thyromegaly present.     Pulmonary/Chest: Effort normal and breath sounds normal. No respiratory distress. She has no wheezes. She has no rales. She exhibits no tenderness.     Cardiovascular: Regular, rate and rhythm. No JVD.    Abdominal: Soft. Bowel sounds are normal. She exhibits no distension and no mass. No tenderness. She has no rebound and no guarding.     Breast:  Declined    Genitourinary:  Pelvic exam was performed with patient supine.  External genitalia with no abnormal pigmentation, labial fusion,rash, lesion or injury to the labia bilaterally. Erythema and " tenderness present.   Vagina is moist with no lesions, foul discharge, erythema, tenderness or bleeding. No foreign body around the vagina or signs of injury. Thick white discharge present.  Cervix exhibits no motion tenderness, no discharge and no friability.   Uterus is anteverted not deviated, not enlarged, not fixed and not tender.  Right adnexum displays no mass, no tenderness and no fullness. Left adnexum displays no mass, no tenderness and no fullness.     Musculoskeletal: Normal range of motion. She exhibits no edema and no tenderness.     Lymphadenopathy: She has no cervical adenopathy.     Neurological: She is alert and oriented to person, place, and time. She exhibits normal muscle tone.     Skin: Skin is warm and dry. No rash noted. She is not diaphoretic. No erythema. No pallor.     Psychiatric: She has a normal mood and affect. Her behavior is normal. Judgment and thought content normal.          Assessment:     1. Birth control counseling  norethindrone-ethinyl estradiol (JUNEL FE 1/20) 1-20 MG-MCG per tablet      2. Well woman exam  THINPREP RFLX HPV ASCUS W/CTNG    VAGINAL PATHOGENS DNA PANEL    HIV AG/AB Combo Assay Screening    T.Pallidum AB BONI (Screening)    Hepatitis C Virus Antibody    HEP B Surface Antibody    Hep B Core AB Total    Hep B Surface Antigen    norethindrone-ethinyl estradiol (JUNEL FE 1/20) 1-20 MG-MCG per tablet    miconazole (MICONAZOLE 7) 2 % Cream      3. Yeast infection involving the vagina and surrounding area  miconazole (MICONAZOLE 7) 2 % Cream      4. Encounter for gynecological examination without abnormal finding              Plan:   Pap and physical exam performed  Vaginal pathogens swab collected - will follow up with results   STI panel ordered   Monthly SBE.  Counseling: breast self exam, STD prevention, and family planning choices  Encourage exercise and proper diet.  Encouraged to discontinue use of Vaseline on vaginal tissue, use non-scented products, cotton  underwear. Use vaginal moisturizer for vaginal dryness.   Monistat for suspected yeast infection.   Mammograms starting @ age 40 annually.  See medications and orders placed in encounter report.

## 2025-02-20 ENCOUNTER — RESULTS FOLLOW-UP (OUTPATIENT)
Dept: OBGYN | Facility: CLINIC | Age: 25
End: 2025-02-20

## 2025-02-20 DIAGNOSIS — Z00.00 WELL WOMAN EXAM WITHOUT GYNECOLOGICAL EXAM: ICD-10-CM

## 2025-02-20 DIAGNOSIS — L30.9 VULVAR DERMATITIS: ICD-10-CM

## 2025-02-20 RX ORDER — HYDROCORTISONE BUTYRATE 1 MG/G
1 CREAM TOPICAL 2 TIMES DAILY
Qty: 45 G | Refills: 0 | Status: SHIPPED | OUTPATIENT
Start: 2025-02-20 | End: 2025-03-06

## 2025-02-21 ENCOUNTER — TELEPHONE (OUTPATIENT)
Dept: OBGYN | Facility: CLINIC | Age: 25
End: 2025-02-21

## 2025-02-24 LAB — THINPREP PAP, CYTOLOGY NL11781: NORMAL

## 2025-02-28 ENCOUNTER — TELEPHONE (OUTPATIENT)
Dept: OBGYN | Facility: CLINIC | Age: 25
End: 2025-02-28
Payer: COMMERCIAL

## 2025-02-28 NOTE — TELEPHONE ENCOUNTER
[facility] on [address] pharmacist called and left a voice message stating that provider Alis OROZCO had sent an Rx to them for hydrocortisone Butyrate 0.1 % cream but it does not come in a cream only lotion and it also doesn't come in 45 g. Pharmacist. They have sent Rx change but have not heard back does the provider still want pt to have this? Message sent to provider and MA.

## 2025-03-01 DIAGNOSIS — L30.9 VULVAR DERMATITIS: ICD-10-CM

## 2025-03-01 RX ORDER — HYDROCORTISONE BUTYRATE 1 MG/ML
1 LOTION TOPICAL 2 TIMES DAILY
Qty: 59 ML | Refills: 0 | Status: SHIPPED | OUTPATIENT
Start: 2025-03-01

## 2025-03-20 NOTE — PROGRESS NOTES
"Child and Adolescent Psychiatry Follow-up note        Visit Type:  Medication management with psychoeducation, supportive, cognitive behavioral  therapy 25 min.         Chief Complaint:   Ritu Mckeon is a 16 y.o., female child accompanied by patient, father for   Chief Complaint   Patient presents with   • Depression   • Anxiety           Review of Systems:  Constitutional:  Negative.  No change in appetite, decreased activity, fatigue or irritability.  Cardiovascular:  Negative.  No irregular heartbeat or palpitations.    Neurologic:  Negative.  No headache or lightheadedness.  Gastrointestinal:  Negative.  No abdominal pain, change in appetite, change in bowel habits, or nausea.  Psychiatric:  Refer to history of present illness.     History of Present Illness:    Ritu reports she has been doing \"okay\" since her last visit.  School is going \"fine\".  She is getting through her class work better.  Ritu states homework is going better.  She is getting along with her peers and friends.  There have been no behavioral issues at school.  At home,  her  behavior has been good.  Anxiety symptoms are not well controlled; mood symptoms are not well controlled either.  Ritu states that she felt that the increase in Prozac with beneficial for the first week but after that she did not feel an appreciable difference. She states that her mood is up and down. She states she is going to work and doing her schoolwork so therefore she is a little more engaged. There are things she enjoys to do. She feels fatigued a lot of the time. Focus and concentration are fair. She states her appetite has been \"okay.\" She states she wants to change her medication.  She denies self harming. She is sleeping fair; she struggles with sleep hygiene because she gets off work late for days per week. She has homework to do before going to bed. She also stays up late communicating with her friends.  She is tolerating her treatment regimen " "well.    Her parent enters the visit.   Her father states that he has noticed an appreciable difference after the medication change. He states for the two weeks they were in [place] she was very engaged in really enjoying herself. She appeared happy the entire time. It was not until they came back to [place] and that she seemed a little more down on occasion. However, he states that her symptoms usually do not last. She has been engaged in school. At home she has been less irritable. They have not had many arguments. She has been behaviorally compliant. He feels that she is doing well on the current dose of Prozac. He does not want to change her medication at this time.  She is sleeping fair; he agrees that sleep hygiene is problematic.  Her appetite has been good.  She is tolerating her medication well.  They deny side effects.      We discussed symptomology and treatment plan. We discussed stressors. We discussed cognitive behavioral strategies.  We discussed expressing emotions appropriately. We reviewed adaptive coping strategies. We discussed  prosocial activities.  We discussed academic interventions.  We discussed sleep hygiene.        Mental Status Exam:     /70 mmHg  Pulse 100  Ht 1.473 m (4' 10\")  Wt 63.776 kg (140 lb 9.6 oz)  BMI 29.39 kg/m2    Musculoskeletal:  no abnormal movements    General Appearance and Manner:  casual dress, normal grooming and hygiene    Attitude:  calm and cooperative    Behavior: no unusual mannerisms or social interaction    Speech:  Normal, rate, volume, tone, coherence and spontaneity    Mood:  euthymic (normal) and anxious    Affect:  reactive and mood congruent and constricted    Thought Processes:  goal directed and concrete     Ability to Abstract:  fair    Thought Content:  Negative for:, suicidal thoughts, homicidal thoughts, auditory hallucinations, visual hallucinations and delusions, obessions, compulsions, phobia    Orientation:  Oriented to:, time, place, " person and self    Language:  no deficit    Memory (Recent, Remote):  intact    Attention:  good - fair    Concentration:  good - fair    Fund of Knowledge:  appears intact    Insight:  fair    Judgement:  fair      Assessment and Plan:    1. Major depressive disorder, recurrent type, mild: Not at goal. Continue Prozac to 80 mg.  She has not been on this dose for the full 6 weeks yet.  We discussed symptoms and adaptive coping strategies. Continue individual therapy.      2. Anxiety disorder, unspecified: not at goal.  Stressors have improved. She is managing stressors better. We review cognitive behavioral strategies. Continue therapeutic intervention. Refer to plan above.    3. Deliberate self harm: no recent self harm endorsed. Continue adaptive coping strategies. Continue therapy.     4. Disruptive behavior disorder: Improved. Continue prosocial activities. Continue behavior strategies. Continue therapy.     5. Sleep disturbance: Not at goal. Sleep hygiene is poor. We reviewed sleep hygiene at length. She has to make a decision to work and do homework versus get to sleep and manage fatigue.    6. I will order a laboratory evaluation once medication titration is completed.     7. Follow-up in 8 weeks.           Please note that this dictation was created using voice recognition software. I have made every reasonable attempt to correct obvious errors, but I expect that there are errors of grammar and possibly content that I did not discover before finalizing the note.     Primary